# Patient Record
Sex: FEMALE | Race: WHITE
[De-identification: names, ages, dates, MRNs, and addresses within clinical notes are randomized per-mention and may not be internally consistent; named-entity substitution may affect disease eponyms.]

---

## 2019-06-21 ENCOUNTER — HOSPITAL ENCOUNTER (INPATIENT)
Dept: HOSPITAL 47 - 4FBP | Age: 29
LOS: 1 days | Discharge: HOME | End: 2019-06-22
Attending: OBSTETRICS & GYNECOLOGY | Admitting: OBSTETRICS & GYNECOLOGY
Payer: COMMERCIAL

## 2019-06-21 VITALS — BODY MASS INDEX: 37.5 KG/M2

## 2019-06-21 DIAGNOSIS — Z3A.39: ICD-10-CM

## 2019-06-21 DIAGNOSIS — O36.63X0: Primary | ICD-10-CM

## 2019-06-21 DIAGNOSIS — Z79.899: ICD-10-CM

## 2019-06-21 LAB
BASOPHILS # BLD AUTO: 0 K/UL (ref 0–0.2)
BASOPHILS NFR BLD AUTO: 0 %
EOSINOPHIL # BLD AUTO: 0.1 K/UL (ref 0–0.7)
EOSINOPHIL NFR BLD AUTO: 1 %
ERYTHROCYTE [DISTWIDTH] IN BLOOD BY AUTOMATED COUNT: 4.05 M/UL (ref 3.8–5.4)
ERYTHROCYTE [DISTWIDTH] IN BLOOD: 15.4 % (ref 11.5–15.5)
HCT VFR BLD AUTO: 31.2 % (ref 34–46)
HGB BLD-MCNC: 10.2 GM/DL (ref 11.4–16)
LYMPHOCYTES # SPEC AUTO: 2.6 K/UL (ref 1–4.8)
LYMPHOCYTES NFR SPEC AUTO: 25 %
MCH RBC QN AUTO: 25.1 PG (ref 25–35)
MCHC RBC AUTO-ENTMCNC: 32.5 G/DL (ref 31–37)
MCV RBC AUTO: 77.2 FL (ref 80–100)
MONOCYTES # BLD AUTO: 0.5 K/UL (ref 0–1)
MONOCYTES NFR BLD AUTO: 5 %
NEUTROPHILS # BLD AUTO: 7 K/UL (ref 1.3–7.7)
NEUTROPHILS NFR BLD AUTO: 67 %
PLATELET # BLD AUTO: 249 K/UL (ref 150–450)
WBC # BLD AUTO: 10.5 K/UL (ref 3.8–10.6)

## 2019-06-21 PROCEDURE — 86850 RBC ANTIBODY SCREEN: CPT

## 2019-06-21 PROCEDURE — 86900 BLOOD TYPING SEROLOGIC ABO: CPT

## 2019-06-21 PROCEDURE — 00HU33Z INSERTION OF INFUSION DEVICE INTO SPINAL CANAL, PERCUTANEOUS APPROACH: ICD-10-PCS

## 2019-06-21 PROCEDURE — 3E0R3BZ INTRODUCTION OF ANESTHETIC AGENT INTO SPINAL CANAL, PERCUTANEOUS APPROACH: ICD-10-PCS

## 2019-06-21 PROCEDURE — 0KQM0ZZ REPAIR PERINEUM MUSCLE, OPEN APPROACH: ICD-10-PCS

## 2019-06-21 PROCEDURE — 3E033VJ INTRODUCTION OF OTHER HORMONE INTO PERIPHERAL VEIN, PERCUTANEOUS APPROACH: ICD-10-PCS

## 2019-06-21 PROCEDURE — 10907ZC DRAINAGE OF AMNIOTIC FLUID, THERAPEUTIC FROM PRODUCTS OF CONCEPTION, VIA NATURAL OR ARTIFICIAL OPENING: ICD-10-PCS

## 2019-06-21 PROCEDURE — 85025 COMPLETE CBC W/AUTO DIFF WBC: CPT

## 2019-06-21 PROCEDURE — 86901 BLOOD TYPING SEROLOGIC RH(D): CPT

## 2019-06-21 RX ADMIN — POTASSIUM CHLORIDE SCH MLS/HR: 14.9 INJECTION, SOLUTION INTRAVENOUS at 07:12

## 2019-06-21 RX ADMIN — POTASSIUM CHLORIDE SCH MLS/HR: 14.9 INJECTION, SOLUTION INTRAVENOUS at 15:00

## 2019-06-21 RX ADMIN — DOCUSATE SODIUM AND SENNOSIDES SCH EACH: 50; 8.6 TABLET ORAL at 19:51

## 2019-06-21 RX ADMIN — IBUPROFEN PRN MG: 600 TABLET ORAL at 19:51

## 2019-06-21 NOTE — P.HPOB
History of Present Illness


H&P Date: 19


Chief Complaint: 39-0/7 weeks, elective induction





The patient is a 28-year-old  4 para  admitted at 39-0/7 weeks as 

established by last menstrual period and confirmed by 8 week ultrasound.  She is

admitted for elective induction with a favorable cervix.  She was found with a 

large for gestational age fetus with ultrasound at 35 weeks demonstrating growth

at the 91st percent percentile.  Her pregnancy has otherwise been uncomplicated 

and group B strep status is negative.  On labor and delivery, all signs 

reassuring.





Obstetrical history:  4 para  with 2 term vaginal deliveries and 1 

early miscarriage not requiring D&C.  Current pregnancy statistics are listed in

history present illness.  EDC of 2019 was established by last menstrual 

period and confirmed by 8 week ultrasound.  Laboratory workup done shows a blood

type of O+ with a negative antibody screen.  Rubella status is immune.  The 

remainder of the laboratory workup was within normal limits.  Early Glucola as 

well as second trimester Glucola were within normal limits and group B strep 

status is negative.





Gynecologic history: Unremarkable with no history of any infections to include 

STDs.





Review of Systems





Review of systems is confined to history of present illness.





Past Medical History


Past Medical History: No Reported History


History of Any Multi-Drug Resistant Organisms: None Reported


Past Surgical History: No Surgical Hx Reported


Past Psychological History: No Psychological Hx Reported


Smoking Status: Never smoker


Past Drug Use History: None Reported





- Past Family History


  ** Mother


Family Medical History: No Reported History





Medications and Allergies


                                Home Medications











 Medication  Instructions  Recorded  Confirmed  Type


 


Acetaminophen/Diphenhydramine 2 each PO HS PRN 19 History





[Tylenol -25mg]    


 


Omeprazole 40 mg PO DAILY 19 History


 


Pnv,Calcium 72/Iron/Folic Acid 1 each PO DAILY 19 History





[Prenatal Plus Tablet]    








                                    Allergies











Allergy/AdvReac Type Severity Reaction Status Date / Time


 


No Known Allergies Allergy   Verified 19 06:47














Exam


                                Intake and Output











 19





 06:59 14:59 22:59


 


Other:   


 


  Weight 96.162 kg  














In general, this is a well-developed, well-nourished white female in no acute 

distress.  Her heart has a regular rhythm and rate without murmur.  Her lungs 

are clear to auscultation bilaterally in all fields.  Her abdomen is gravid, 

nondistended, has normal active bowel sounds, is soft, nontender, and without 

any palpable masses aside from the uterine fundus.  Her extremities are without 

any cyanosis, clubbing, or significant edema and are nontender to palpation 

bilaterally.  Digital cervical examination demonstrates her cervix to be 3 cm 

dilated, approximate 70% effaced, with the vertex in presentation at -2 station.

 Artificial rupture of membranes is carried out demonstrating clear fluid.





Results


Result Diagrams: 


                                 19 07:00





                  Abnormal Lab Results - Last 24 Hours (Table)











  19 Range/Units





  07:00 


 


Hgb  10.2 L  (11.4-16.0)  gm/dL


 


Hct  31.2 L  (34.0-46.0)  %


 


MCV  77.2 L  (80.0-100.0)  fL














Assessment and Plan


(1) Term pregnancy


Current Visit: Yes   Status: Acute   Code(s): Z34.90 - ENCNTR FOR SUPRVSN OF 

NORMAL PREGNANCY, UNSP, UNSP TRIMESTER   SNOMED Code(s): 04220139


   





(2) Large for gestational age fetus


Current Visit: Yes   Status: Acute   Code(s): WAT5436 -    SNOMED Code(s): 

320114945


   


Plan: 





The patient is admitted for elective induction of labor.  She understands the 

slightly increased risk for  delivery under the circumstances but does 

have a favorable cervix.  Pitocin augmentation has been started and she has 

undergone artificial rupture of membranes.  She will have close maternal and 

fetal surveillance and expectant management will be practiced.  She is a good 

candidate for either IV or epidural analgesia and has requested an epidural 

catheter replaced when possible.

## 2019-06-21 NOTE — P.PROBDLV
Vaginal Delivery Note





- .


Vaginal Delivery Note: 





The patient is a 28-year-old  4 para  admitted at 39-0/7 weeks by 

good dating parameters.  She is admitted for an elective induction of labor with

favorable cervix.  On admission all signs reassuring.  Her pregnancy was 

uncomplicated though the fetus was growing at the 91st percentile by ultrasound 

at 35 weeks.  She had Pitocin augmentation started followed by artificial 

rupture of membranes for clear fluid.  She had an epidural catheter placed 

around the onset of the active phase of labor.  She progressed relatively 

steadily but slowly through the active phase of labor but ultimately reached 

complete after which time she pushed over the course of approximately 6 

contractions to a normal spontaneous vaginal delivery of a viable 8 lbs. 5 oz. 

baby girl with Apgars of 8 at 1 minute and 9 at 5 minutes delivered in the left 

occiput anterior position.  The placenta was delivered spontaneously, intact, 

and grossly normal with a grossly normal three-vessel cord inserted 

approximately 4 cm from the margin of the placental disc.  There was a small 

second-degree midline laceration over the site of a previous laceration which 

was repaired in standard fashion using 3-0 chromic catgut without difficulty.  

Estimated blood loss for the case suppression 150 mL.  There were no 

complications.  All sponge, instrument, and needle counts were correct.  Both 

mother and infant are resting comfortably in recovery.

## 2019-06-22 VITALS — SYSTOLIC BLOOD PRESSURE: 133 MMHG | RESPIRATION RATE: 16 BRPM | HEART RATE: 70 BPM | DIASTOLIC BLOOD PRESSURE: 72 MMHG

## 2019-06-22 VITALS — TEMPERATURE: 97.8 F

## 2019-06-22 RX ADMIN — IBUPROFEN PRN MG: 600 TABLET ORAL at 08:34

## 2019-06-22 RX ADMIN — DOCUSATE SODIUM AND SENNOSIDES SCH EACH: 50; 8.6 TABLET ORAL at 08:34

## 2019-06-22 RX ADMIN — POTASSIUM CHLORIDE SCH: 14.9 INJECTION, SOLUTION INTRAVENOUS at 02:50

## 2019-06-22 RX ADMIN — IBUPROFEN PRN MG: 600 TABLET ORAL at 14:59

## 2019-06-22 NOTE — P.DS
Providers


Date of admission: 


19 06:30





Expected date of discharge: 19


Attending physician: 


Og Jeter





Primary care physician: 


Stated None








- Discharge Diagnosis(es)


(1) Term pregnancy


Current Visit: Yes   Status: Acute   





(2) Large for gestational age fetus


Current Visit: Yes   Status: Acute   





(3) Normal spontaneous vaginal delivery


Current Visit: Yes   Status: Acute   


Hospital Course: 





The patient is a 28-year-old  4 para  admitted at 39-0/7 weeks by 

good dating parameters for an elective induction.  She was known to have a large

for gestational age fetus growing at the 94th percentile at 35 weeks.  Her 

pregnancy was otherwise uncomplicated and group B strep status is negative.  On 

labor and delivery, all signs reassuring.  She had Pitocin started followed by 

artificial rupture of membranes for clear fluid.  She had an epidural catheter 

placed for analgesia.  She progressed throughout the day to complete and then 

pushed fairly quickly to a normal spontaneous vaginal delivery of a viable 8 

lbs. 5 oz. baby girl with Apgars of 8 at 1 minute and 9 at 5 minutes.  Her 

postpartum course was unremarkable with vital signs remaining stable and her 

temperature was afebrile throughout.  She was deemed stable for discharge on 

postpartum day #1 and was discharged home to follow-up in the office in 6 weeks'

time routinely.  Discharge instructions included calling for any significantly 

increased bleeding or foul-smelling lochia, significantly increased fever or 

abdominal pain, perineal complaints, breast complaints, or anything else that 

concerned her.  She is additionally instructed to have nothing in the vagina for

at least 6 weeks time to include intercourse.  She understood her instructions 

and agrees to follow up as noted above.  Discharge medications included 

continued prenatal vitamins as she has opted to breast-feed.  She otherwise was 

to use over-the-counter analgesic pain medications as needed.  Maternal blood 

type is O+ and rubella status is immune.


Procedures: 





#1.  Pitocin induction #2.  Artificial rupture of membranes #3.  Epidural 

analgesia #4.  Normal spontaneous vaginal delivery #5.  Repair of perineal 

laceration


Patient Condition at Discharge: Good





Plan - Discharge Summary


New Discharge Prescriptions: 


No Action


   Pnv,Calcium 72/Iron/Folic Acid [Prenatal Plus Tablet] 1 each PO DAILY


   Omeprazole 40 mg PO DAILY


   Acetaminophen/Diphenhydramine [Tylenol -25mg] 2 each PO HS PRN


     PRN Reason: Pain


Discharge Medication List





Acetaminophen/Diphenhydramine [Tylenol -25mg] 2 each PO HS PRN 19 

[History]


Omeprazole 40 mg PO DAILY 19 [History]


Pnv,Calcium 72/Iron/Folic Acid [Prenatal Plus Tablet] 1 each PO DAILY 19 

[History]








Follow up Appointment(s)/Referral(s): 


Og Jeter MD [STAFF PHYSICIAN] - 6 Weeks


Discharge Disposition: HOME SELF-CARE

## 2021-02-07 ENCOUNTER — HOSPITAL ENCOUNTER (INPATIENT)
Dept: HOSPITAL 47 - EC | Age: 31
LOS: 1 days | Discharge: HOME | DRG: 62 | End: 2021-02-08
Attending: FAMILY MEDICINE | Admitting: FAMILY MEDICINE
Payer: COMMERCIAL

## 2021-02-07 DIAGNOSIS — G81.91: ICD-10-CM

## 2021-02-07 DIAGNOSIS — Z79.3: ICD-10-CM

## 2021-02-07 DIAGNOSIS — Z80.9: ICD-10-CM

## 2021-02-07 DIAGNOSIS — G43.909: ICD-10-CM

## 2021-02-07 DIAGNOSIS — E66.9: ICD-10-CM

## 2021-02-07 DIAGNOSIS — Z20.822: ICD-10-CM

## 2021-02-07 DIAGNOSIS — Z79.899: ICD-10-CM

## 2021-02-07 DIAGNOSIS — D68.69: ICD-10-CM

## 2021-02-07 DIAGNOSIS — Z98.890: ICD-10-CM

## 2021-02-07 DIAGNOSIS — E78.5: ICD-10-CM

## 2021-02-07 DIAGNOSIS — Z82.49: ICD-10-CM

## 2021-02-07 DIAGNOSIS — G45.9: Primary | ICD-10-CM

## 2021-02-07 DIAGNOSIS — G43.109: ICD-10-CM

## 2021-02-07 LAB
ALBUMIN SERPL-MCNC: 4.9 G/DL (ref 3.5–5)
ALP SERPL-CCNC: 113 U/L (ref 38–126)
ALT SERPL-CCNC: 27 U/L (ref 4–34)
ANION GAP SERPL CALC-SCNC: 11 MMOL/L
APTT BLD: 24.7 SEC (ref 22–30)
AST SERPL-CCNC: 24 U/L (ref 14–36)
BASOPHILS # BLD AUTO: 0 K/UL (ref 0–0.2)
BASOPHILS NFR BLD AUTO: 1 %
BUN SERPL-SCNC: 19 MG/DL (ref 7–17)
CALCIUM SPEC-MCNC: 10 MG/DL (ref 8.4–10.2)
CHLORIDE SERPL-SCNC: 106 MMOL/L (ref 98–107)
CHOLEST SERPL-MCNC: 218 MG/DL (ref ?–200)
CO2 SERPL-SCNC: 24 MMOL/L (ref 22–30)
EOSINOPHIL # BLD AUTO: 0.1 K/UL (ref 0–0.7)
EOSINOPHIL NFR BLD AUTO: 2 %
ERYTHROCYTE [DISTWIDTH] IN BLOOD BY AUTOMATED COUNT: 5.27 M/UL (ref 3.8–5.4)
ERYTHROCYTE [DISTWIDTH] IN BLOOD: 13.7 % (ref 11.5–15.5)
GLUCOSE BLD-MCNC: 80 MG/DL (ref 75–99)
GLUCOSE BLD-MCNC: 84 MG/DL (ref 75–99)
GLUCOSE SERPL-MCNC: 101 MG/DL (ref 74–99)
HCT VFR BLD AUTO: 42.9 % (ref 34–46)
HDLC SERPL-MCNC: 58 MG/DL (ref 40–60)
HGB BLD-MCNC: 14.2 GM/DL (ref 11.4–16)
INR PPP: 0.9 (ref ?–1.2)
LDLC SERPL CALC-MCNC: 142 MG/DL (ref 0–99)
LYMPHOCYTES # SPEC AUTO: 2.7 K/UL (ref 1–4.8)
LYMPHOCYTES NFR SPEC AUTO: 36 %
MCH RBC QN AUTO: 26.9 PG (ref 25–35)
MCHC RBC AUTO-ENTMCNC: 33.1 G/DL (ref 31–37)
MCV RBC AUTO: 81.5 FL (ref 80–100)
MONOCYTES # BLD AUTO: 0.4 K/UL (ref 0–1)
MONOCYTES NFR BLD AUTO: 5 %
NEUTROPHILS # BLD AUTO: 4.2 K/UL (ref 1.3–7.7)
NEUTROPHILS NFR BLD AUTO: 56 %
PH UR: 6.5 [PH] (ref 5–8)
PLATELET # BLD AUTO: 260 K/UL (ref 150–450)
POTASSIUM SERPL-SCNC: 3.7 MMOL/L (ref 3.5–5.1)
PROT SERPL-MCNC: 8 G/DL (ref 6.3–8.2)
PT BLD: 10 SEC (ref 9–12)
SODIUM SERPL-SCNC: 141 MMOL/L (ref 137–145)
SP GR UR: 1.05 (ref 1–1.03)
TRIGL SERPL-MCNC: 92 MG/DL (ref ?–150)
UROBILINOGEN UR QL STRIP: <2 MG/DL (ref ?–2)
WBC # BLD AUTO: 7.5 K/UL (ref 3.8–10.6)

## 2021-02-07 PROCEDURE — 71045 X-RAY EXAM CHEST 1 VIEW: CPT

## 2021-02-07 PROCEDURE — 81003 URINALYSIS AUTO W/O SCOPE: CPT

## 2021-02-07 PROCEDURE — 93308 TTE F-UP OR LMTD: CPT

## 2021-02-07 PROCEDURE — 36415 COLL VENOUS BLD VENIPUNCTURE: CPT

## 2021-02-07 PROCEDURE — 99291 CRITICAL CARE FIRST HOUR: CPT

## 2021-02-07 PROCEDURE — 85025 COMPLETE CBC W/AUTO DIFF WBC: CPT

## 2021-02-07 PROCEDURE — 80053 COMPREHEN METABOLIC PANEL: CPT

## 2021-02-07 PROCEDURE — 84443 ASSAY THYROID STIM HORMONE: CPT

## 2021-02-07 PROCEDURE — 81025 URINE PREGNANCY TEST: CPT

## 2021-02-07 PROCEDURE — 87635 SARS-COV-2 COVID-19 AMP PRB: CPT

## 2021-02-07 PROCEDURE — 84484 ASSAY OF TROPONIN QUANT: CPT

## 2021-02-07 PROCEDURE — 85610 PROTHROMBIN TIME: CPT

## 2021-02-07 PROCEDURE — 80061 LIPID PANEL: CPT

## 2021-02-07 PROCEDURE — 70450 CT HEAD/BRAIN W/O DYE: CPT

## 2021-02-07 PROCEDURE — 96360 HYDRATION IV INFUSION INIT: CPT

## 2021-02-07 PROCEDURE — 51702 INSERT TEMP BLADDER CATH: CPT

## 2021-02-07 PROCEDURE — 85730 THROMBOPLASTIN TIME PARTIAL: CPT

## 2021-02-07 PROCEDURE — 70496 CT ANGIOGRAPHY HEAD: CPT

## 2021-02-07 PROCEDURE — 37195 THROMBOLYTIC THERAPY STROKE: CPT

## 2021-02-07 PROCEDURE — 80048 BASIC METABOLIC PNL TOTAL CA: CPT

## 2021-02-07 PROCEDURE — 93005 ELECTROCARDIOGRAM TRACING: CPT

## 2021-02-07 PROCEDURE — 70498 CT ANGIOGRAPHY NECK: CPT

## 2021-02-07 PROCEDURE — 70551 MRI BRAIN STEM W/O DYE: CPT

## 2021-02-07 PROCEDURE — 80306 DRUG TEST PRSMV INSTRMNT: CPT

## 2021-02-07 PROCEDURE — 93306 TTE W/DOPPLER COMPLETE: CPT

## 2021-02-07 RX ADMIN — CEFAZOLIN SCH MLS/HR: 330 INJECTION, POWDER, FOR SOLUTION INTRAMUSCULAR; INTRAVENOUS at 15:29

## 2021-02-07 NOTE — CT
EXAMINATION TYPE: CT brain wo con for TPA

 

DATE OF EXAM: 2/7/2021

 

COMPARISON:

 

HISTORY: Rt side numbness

 

CT DLP: 1042.8 mGycm

 

Unenhanced CT of the brain was performed.  

 

The ventricles, basal cisterns and sulci overlying the cerebral convexities demonstrate a normal appe
arance.  

 

There is no evidence for intracranial hemorrhage or sulcal effacement.  

 

No mass effects are seen.  

 

Osseous calvarium is intact.

 

If symptoms persist consider MRI as clinically warranted.  

 

IMPRESSION:

 

1.  No acute intracranial process is seen at this time.

## 2021-02-07 NOTE — CT
EXAMINATION TYPE: CT angio head neck

 

DATE OF EXAM: 2/7/2021

 

COMPARISON: None

 

HISTORY: Rt side numbness

 

CT DLP: 583.6 mGycm

Automated exposure control for dose reduction was used.

 

CONTRAST: 

Performed with IV Contrast, patient injected with 65 mL of Isovue 370.

 

Images were obtained from the aortic arch to the vertex of the brain with IV contrast and 3-D post pr
ocessed images.

 

There is normal branching pattern of the great vessels on the aortic arch. Left vertebral artery has 
origin on the aortic arch. There is arterial flow in both subclavian arteries. There is arterial flow
 in the common internal and external carotid arteries bilaterally. There is wide patency of the carot
id artery bifurcations. There is arterial flow in both vertebral arteries which are fairly symmetric.
 There is no evidence of carotid or vertebral artery aneurysm or dissection. Vertebrobasilar artery s
ystem appears normal.

 

There is arterial flow in the anterior middle and posterior cerebral arteries. I see no evidence of i
ntracranial arterial stenosis. There is no sign of aneurysm or neovascularity. There is no mass effec
t.

 

There is normal contrast opacification of the venous sinuses. Calvarium is intact.

 

IMPRESSION:

Normal CT angiogram of the neck.

 

Normal CT angiogram of the brain.

## 2021-02-07 NOTE — XR
EXAMINATION TYPE: XR chest 1V portable

 

DATE OF EXAM: 2/7/2021

 

COMPARISON: NONE

 

HISTORY: Weakness

 

TECHNIQUE: Single view

 

FINDINGS: Heart and mediastinum are normal. Lungs are clear. Diaphragm is normal. There are chest navid
ds. Bony thorax is intact.

 

IMPRESSION: Normal chest. Normal heart.

## 2021-02-07 NOTE — ED
General Adult HPI





- General


Chief complaint: Neuro Symptoms/Deficit


Stated complaint: R Sided Numbness


Time Seen by Provider: 02/07/21 13:10


Source: patient, RN notes reviewed, old records reviewed


Mode of arrival: ambulatory


Limitations: no limitations





- History of Present Illness


Initial comments: 





30-year-old female history of migraine headache presenting for evaluation of 

right-sided weakness.  The symptoms began suddenly between 10:30 and 11 AM this 

morning.  She is unable to move her right arm or right leg at all.  She is not 

noted to have any facial weakness.  No difficulty speaking.  She denies 

headache.  She denies chest pain or palpitations.  She denies any recent 

illness.  She has no previous history of TIA or CVA.  She has no previous 

history of limb weakness with migraine headache.  She denies abdominal pain 

nausea vomiting.  She has been eating and drinking well.  No previous cardiac 

history.





- Related Data


                                Home Medications











 Medication  Instructions  Recorded  Confirmed


 


Acetaminophen/Diphenhydramine 2 each PO HS PRN 06/21/19 06/21/19





[Tylenol -25mg]   


 


Omeprazole 40 mg PO DAILY 06/21/19 06/21/19


 


Pnv,Calcium 72/Iron/Folic Acid 1 each PO DAILY 06/21/19 06/21/19





[Prenatal Plus Tablet]   











                                    Allergies











Allergy/AdvReac Type Severity Reaction Status Date / Time


 


No Known Allergies Allergy   Verified 02/07/21 13:18














Review of Systems


ROS Statement: 


Those systems with pertinent positive or pertinent negative responses have been 

documented in the HPI.





ROS Other: All systems not noted in ROS Statement are negative.





Past Medical History


Past Medical History: No Reported History


Additional Past Medical History / Comment(s): migraines


History of Any Multi-Drug Resistant Organisms: None Reported


Past Surgical History: No Surgical Hx Reported


Past Psychological History: No Psychological Hx Reported


Smoking Status: Never smoker


Past Alcohol Use History: Rare


Past Drug Use History: None Reported





- Past Family History


  ** Mother


Family Medical History: No Reported History





General Exam


Limitations: no limitations


General appearance: alert, in no apparent distress


Head exam: Present: atraumatic, normocephalic


Eye exam: Present: normal appearance, PERRL


ENT exam: Present: normal exam


Neck exam: Present: normal inspection.  Absent: tenderness, meningismus


Respiratory exam: Present: normal lung sounds bilaterally.  Absent: respiratory 

distress, wheezes


Cardiovascular Exam: Present: regular rate, normal rhythm


GI/Abdominal exam: Present: soft.  Absent: distended, tenderness, guarding


Extremities exam: Present: normal inspection, normal capillary refill, other 

(Normal distal pulses).  Absent: calf tenderness


Neurological exam: Present: alert, oriented X3, motor sensory deficit (Patient 

is hemiplegic, no movement of the right arm or right leg.  Her sensation is 

intact.)


Psychiatric exam: Present: normal affect, normal mood.  Absent: depressed, 

anxious


Skin exam: Present: warm, dry, intact.  Absent: cyanosis, diaphoretic





Course


                                   Vital Signs











  02/07/21 02/07/21 02/07/21





  13:10 13:30 13:45


 


Temperature 98 F 98.2 F 98.4 F


 


Pulse Rate 82 65 82


 


Respiratory 18 18 18





Rate   


 


Blood Pressure 129/81 131/90 128/89


 


O2 Sat by Pulse 97 99 98





Oximetry   














  02/07/21 02/07/21 02/07/21





  14:00 14:15 14:30


 


Temperature 98.8 F 98.4 F 98.4 F


 


Pulse Rate 64 63 82


 


Respiratory 18 18 18





Rate   


 


Blood Pressure 128/86 124/79 124/84


 


O2 Sat by Pulse 99 100 99





Oximetry   














  02/07/21 02/07/21





  14:45 15:00


 


Temperature 98.4 F 98.0 F


 


Pulse Rate 84 60


 


Respiratory 18 18





Rate  


 


Blood Pressure 127/82 117/81


 


O2 Sat by Pulse 99 99





Oximetry  














- Reevaluation(s)


Reevaluation #1: 





02/07/21 13:36


Patient denies current pregnancy, states she is on Depo-Provera.


02/07/21 13:40


Case discussed with stroke neurologist Dr. Glez, recommends TPA if CT is 

negative.  NIH of 10.


Reevaluation #2: 





02/07/21 1409


Discussed with Dr. Sosa, TPA has been initiated, head CT and CT angiography 

negative.  Recommends patient be admitted to the ICU with neurology on consult. 

No need for transfer at this time.





EKG Findings





- EKG Comments:


EKG Findings:: EKG: Sinus rhythm rate of 70, NE interval 110, QRS duration 82, 

 no ST segment elevation.





Medical Decision Making





- Medical Decision Making





30-year-old female presenting with signs and symptoms suggestive of acute CVA 

with onset time which is within TPA window.  She's taken immediately to head CT 

which is performed negative for intracranial hemorrhage.  TPA is initiated with 

the guidance of stroke neurologist Dr. Sosa.  CT angiography performed and 

there is no acute findings.  No cyanosis or occlusion.  Patient has a normal 

CBC, normal CMP.  Urinalysis is pending.  Patient has some improvement with an 

NIH initially of 10 which improves to an NIH of 6 while in the emergency 

Department with some improvement in the upper extremity.  I discussed case with 

Dr. Ariana brown for neurology who is agreeable with admission recommends no 

antiplatelets or anticoagulants until repeat imaging is obtained.  I did discuss

case with Dr. Lane covering for the ICU will accept this patient.  Patient has 

been admitted to Dr. Bernardo who is aware of the patient.





- Lab Data


Result diagrams: 


                                 02/07/21 13:46





                                 02/07/21 13:46


                                   Lab Results











  02/07/21 02/07/21 02/07/21 Range/Units





  13:30 13:46 13:46 


 


WBC   7.5   (3.8-10.6)  k/uL


 


RBC   5.27   (3.80-5.40)  m/uL


 


Hgb   14.2   (11.4-16.0)  gm/dL


 


Hct   42.9   (34.0-46.0)  %


 


MCV   81.5   (80.0-100.0)  fL


 


MCH   26.9   (25.0-35.0)  pg


 


MCHC   33.1   (31.0-37.0)  g/dL


 


RDW   13.7   (11.5-15.5)  %


 


Plt Count   260   (150-450)  k/uL


 


MPV   6.9   


 


Neutrophils %   56   %


 


Lymphocytes %   36   %


 


Monocytes %   5   %


 


Eosinophils %   2   %


 


Basophils %   1   %


 


Neutrophils #   4.2   (1.3-7.7)  k/uL


 


Lymphocytes #   2.7   (1.0-4.8)  k/uL


 


Monocytes #   0.4   (0-1.0)  k/uL


 


Eosinophils #   0.1   (0-0.7)  k/uL


 


Basophils #   0.0   (0-0.2)  k/uL


 


PT    10.0  (9.0-12.0)  sec


 


INR    0.9  (<1.2)  


 


APTT    24.7  (22.0-30.0)  sec


 


Sodium     (137-145)  mmol/L


 


Potassium     (3.5-5.1)  mmol/L


 


Chloride     ()  mmol/L


 


Carbon Dioxide     (22-30)  mmol/L


 


Anion Gap     mmol/L


 


BUN     (7-17)  mg/dL


 


Creatinine     (0.52-1.04)  mg/dL


 


Est GFR (CKD-EPI)AfAm     (>60 ml/min/1.73 sqM)  


 


Est GFR (CKD-EPI)NonAf     (>60 ml/min/1.73 sqM)  


 


Glucose     (74-99)  mg/dL


 


POC Glucose (mg/dL)  84    (75-99)  mg/dL


 


POC Glu Operater ID  Yashira Thornton    


 


Calcium     (8.4-10.2)  mg/dL


 


Total Bilirubin     (0.2-1.3)  mg/dL


 


AST     (14-36)  U/L


 


ALT     (4-34)  U/L


 


Alkaline Phosphatase     ()  U/L


 


Troponin I     (0.000-0.034)  ng/mL


 


Total Protein     (6.3-8.2)  g/dL


 


Albumin     (3.5-5.0)  g/dL


 


Urine HCG, Qual     (Not Detectd)  














  02/07/21 02/07/21 02/07/21 Range/Units





  13:46 13:46 14:51 


 


WBC     (3.8-10.6)  k/uL


 


RBC     (3.80-5.40)  m/uL


 


Hgb     (11.4-16.0)  gm/dL


 


Hct     (34.0-46.0)  %


 


MCV     (80.0-100.0)  fL


 


MCH     (25.0-35.0)  pg


 


MCHC     (31.0-37.0)  g/dL


 


RDW     (11.5-15.5)  %


 


Plt Count     (150-450)  k/uL


 


MPV     


 


Neutrophils %     %


 


Lymphocytes %     %


 


Monocytes %     %


 


Eosinophils %     %


 


Basophils %     %


 


Neutrophils #     (1.3-7.7)  k/uL


 


Lymphocytes #     (1.0-4.8)  k/uL


 


Monocytes #     (0-1.0)  k/uL


 


Eosinophils #     (0-0.7)  k/uL


 


Basophils #     (0-0.2)  k/uL


 


PT     (9.0-12.0)  sec


 


INR     (<1.2)  


 


APTT     (22.0-30.0)  sec


 


Sodium  141    (137-145)  mmol/L


 


Potassium  3.7    (3.5-5.1)  mmol/L


 


Chloride  106    ()  mmol/L


 


Carbon Dioxide  24    (22-30)  mmol/L


 


Anion Gap  11    mmol/L


 


BUN  19 H    (7-17)  mg/dL


 


Creatinine  0.68    (0.52-1.04)  mg/dL


 


Est GFR (CKD-EPI)AfAm  >90    (>60 ml/min/1.73 sqM)  


 


Est GFR (CKD-EPI)NonAf  >90    (>60 ml/min/1.73 sqM)  


 


Glucose  101 H    (74-99)  mg/dL


 


POC Glucose (mg/dL)     (75-99)  mg/dL


 


POC Glu Operater ID     


 


Calcium  10.0    (8.4-10.2)  mg/dL


 


Total Bilirubin  0.6    (0.2-1.3)  mg/dL


 


AST  24    (14-36)  U/L


 


ALT  27    (4-34)  U/L


 


Alkaline Phosphatase  113    ()  U/L


 


Troponin I   <0.012   (0.000-0.034)  ng/mL


 


Total Protein  8.0    (6.3-8.2)  g/dL


 


Albumin  4.9    (3.5-5.0)  g/dL


 


Urine HCG, Qual    Not Detected  (Not Detectd)  














Critical Care Time


Critical Care Time: Yes


Total Critical Care Time: 35





Disposition


Clinical Impression: 


 Cerebrovascular accident (CVA)





Disposition: ADMITTED AS IP TO THIS [facility]


Condition: Serious


Is patient prescribed a controlled substance at d/c from ED?: No


Referrals: 


Angela Bull DO [Primary Care Provider] - 1-2 days


Decision to Admit Reason: Admit from EC


Decision Date: 02/07/21


Decision Time: 15:15

## 2021-02-07 NOTE — HP
HISTORY AND PHYSICAL



DATE OF SERVICE:

02/07/2021



CHIEF COMPLAINT:

Weakness of the right side of the body.



HISTORY OF PRESENT ILLNESS:

This 30-year-old woman with a past medical history of recurrent migraines, being

followed by Dr. Bull in the outpatient setting was complaining of weakness of the

right side of the body since 11 o'clock.  The patient had sudden onset of weakness

almost grade 0 power.  The patient unable to move the leg at all and there is no facial

weakness and the patient was evaluated by tele neurology, Dr. Dotson and Neurology and

CT angio, brain CT was also done and the patient was given tPA and the patient being

closely monitored.  Patient is supposed to be monitored and transferred to ICU at this

time.  There is no history of fever, rigors.  No history of headache, loss of

consciousness, seizures at this time.



PAST MEDICAL HISTORY:

History of migraine.



MEDICATIONS:

Prior to admission include Depo shots.  Home medications: Omeprazole, Tylenol and

prenatal vitamins.



ALLERGIES:

None.



FAMILY HISTORY:

No history of heart disease or strokes in the family.



SOCIAL HISTORY:

No history of smoking.  No history of alcohol intake.



REVIEW OF SYSTEMS:

ENT as mentioned earlier.

CARDIOVASCULAR: No angina.

RESPIRATION: No cough or hemoptysis.

GI no nausea or vomiting.

: No dysuria.

NERVOUS SYSTEM as mentioned earlier.

ALLERGY/IMMUNOLOGY:  No asthma or hayfever.

MUSCULOSKELETAL: As mentioned earlier.

HEMATOLOGY/ONCOLOGY: No history of anemia.

ENDOCRINE: No history of diabetes or hypothyroidism.

CONSTITUTIONAL: As mentioned earlier.

DERMATOLOGY:  Negative.

RHEUMATOLOGY negative.

PSYCHIATRY as mentioned earlier.



PHYSICAL EXAMINATION:

Alert and oriented x3.  Pulse 60. Blood pressure 117/80, respirations 18, temperature

98 degrees, pulse ox 99% on room air.

HEENT: Conjunctivae normal.

NECK: No JVD.

CARDIOVASCULAR: S1, S2.

RESPIRATORY: Breath sounds diminished in the bases.  No rhonchi. No crackles.

ABDOMEN:  Soft, nontender.

LEGS are no edema. No swelling.

NERVOUS SYSTEM: Higher functions as mentioned earlier. Cranial nerves II-XII grossly

intact.  Otherwise significant weakness of the grade 1 power on the right upper and

right lower limb.  The reflexes are diminished.  Gait not tested.

SKIN: No ulcers, rashes or bleeding.

JOINTS: No active deforming arthropathy.

LYMPHATICS: No lymph nodes palpable in the neck, axillae or groin.



LABS:

At this time, CBC within normal limits. Sodium 140, potassium 3.7.  UA noted.  Drug

screen is negative.



ASSESSMENT:

1. Possible acute cerebrovascular accident involving the left hemisphere causing right-

    sided weakness, status post tPA.

2. History of migraine.

3. Increased BUN.

4. Increased random blood glucose.

5. Obesity with body mass index 32.



RECOMMENDATIONS AND DISCUSSION:

This 30-year-old woman who presented with multiple medical issues, at this time, I

recommend to continue current medications, management and symptomatic treatment.

Continue with TPA.  Possible TPA protocol.  Neurology consult, neurology evaluation.

Neuro checks.  Monitor closely.  ICU management per Dr. Lane.  The prognosis guarded

because of multiple complex medical issues.  Further recommendations to follow. Two-D

echo and lipid panel also has been requested.  Repeat labs also will be obtained. The

EKG shows no acute abnormality and the chest x-ray was also reviewed.  Prognosis

guarded because of multiple complex medical issues.  Dr. Gonzales will follow tomorrow.



MMODL / IJN: [id number] / Job#: [id number]

## 2021-02-08 VITALS — DIASTOLIC BLOOD PRESSURE: 70 MMHG | RESPIRATION RATE: 14 BRPM | HEART RATE: 73 BPM | SYSTOLIC BLOOD PRESSURE: 116 MMHG

## 2021-02-08 VITALS — TEMPERATURE: 98.3 F

## 2021-02-08 LAB
ANION GAP SERPL CALC-SCNC: 9 MMOL/L
BASOPHILS # BLD AUTO: 0 K/UL (ref 0–0.2)
BASOPHILS NFR BLD AUTO: 0 %
BUN SERPL-SCNC: 19 MG/DL (ref 7–17)
CALCIUM SPEC-MCNC: 8.8 MG/DL (ref 8.4–10.2)
CHLORIDE SERPL-SCNC: 109 MMOL/L (ref 98–107)
CO2 SERPL-SCNC: 21 MMOL/L (ref 22–30)
EOSINOPHIL # BLD AUTO: 0.1 K/UL (ref 0–0.7)
EOSINOPHIL NFR BLD AUTO: 2 %
ERYTHROCYTE [DISTWIDTH] IN BLOOD BY AUTOMATED COUNT: 4.01 M/UL (ref 3.8–5.4)
ERYTHROCYTE [DISTWIDTH] IN BLOOD: 14.3 % (ref 11.5–15.5)
GLUCOSE SERPL-MCNC: 92 MG/DL (ref 74–99)
HCT VFR BLD AUTO: 33.4 % (ref 34–46)
HGB BLD-MCNC: 11.5 GM/DL (ref 11.4–16)
LYMPHOCYTES # SPEC AUTO: 3.7 K/UL (ref 1–4.8)
LYMPHOCYTES NFR SPEC AUTO: 46 %
MCH RBC QN AUTO: 28.8 PG (ref 25–35)
MCHC RBC AUTO-ENTMCNC: 34.5 G/DL (ref 31–37)
MCV RBC AUTO: 83.3 FL (ref 80–100)
MONOCYTES # BLD AUTO: 0.4 K/UL (ref 0–1)
MONOCYTES NFR BLD AUTO: 5 %
NEUTROPHILS # BLD AUTO: 3.5 K/UL (ref 1.3–7.7)
NEUTROPHILS NFR BLD AUTO: 45 %
PLATELET # BLD AUTO: 210 K/UL (ref 150–450)
POTASSIUM SERPL-SCNC: 3.7 MMOL/L (ref 3.5–5.1)
SODIUM SERPL-SCNC: 139 MMOL/L (ref 137–145)
WBC # BLD AUTO: 7.9 K/UL (ref 3.8–10.6)

## 2021-02-08 RX ADMIN — CEFAZOLIN SCH MLS/HR: 330 INJECTION, POWDER, FOR SOLUTION INTRAMUSCULAR; INTRAVENOUS at 01:28

## 2021-02-08 NOTE — P.DS
Providers


Date of admission: 


02/07/21 15:10





Expected date of discharge: 02/08/21


Attending physician: 


Dylan Gonzales MD





Consults: 





                                        





02/07/21 15:10


Consult Physician Urgent 


   Consulting Provider: Reji Lane


   Consult Reason/Comments: cva


   Do you want consulting provider notified?: Already Contacted


Consult Physician Urgent 


   Consulting Provider: Amarjit Lane


   Consult Reason/Comments: cva


   Do you want consulting provider notified?: Already Contacted











Primary care physician: 


Angela Bull





Hospital Course: 


Final Diagnoses:


Acute right hemiparesis, resolved, status post TPA, of possible TIA, possible 

complex migraine


Chronic migraines


Excessive caffeine intake, drinks 1 pot of coffee per day


Hyperlipidemia


Obesity, BMI 32

















Hospital course: This is a 30-year-old female presented to the ER with acute 

right-sided weakness, status post TPA with complete resolution of symptoms.  2 

through 12 grossly intact, no focal deficits, no residuals.  Evaluated by 

neurology, intensivist . Neuro workup completed :CTA of head and neck reported 

normal .MRI reporting normal brain, mild sinus disease.  The echo reported 

normal LV function, EF 55-60% with no aortic stenosis or regurgitation. 

Significant clinical improvement.  Cleared by both neurology and intensivist for

discharge.  Patient will be discharged home today in a stable condition with 

guarded prognosis.  Birth control  at this time contraindicated because of 

possible complex migraine versus TIA with further recommendations pending from 

her follow-up visit with PCP/GYN. for specific details.  Statin and aspirin 

initiated.











The impression and plan of care has been dictated as directed.





:


I performed a history and examination of this patient,  discussed the same with 

the dictator.  I agree with the dictator's note ,documented as a scribe.  Any 

additional findings or plans will be noted.








Patient Condition at Discharge: Stable





Plan - Discharge Summary


Discharge Rx Participant: No


New Discharge Prescriptions: 


New


   Aspirin EC [Ecotrin Low Dose] 81 mg PO DAILY #30 tablet.


   Atorvastatin [Lipitor] 80 mg PO HS #30 tab





Continue


   Multivitamins, Thera [Multivitamin (formulary)] 1 tab PO HS


   FLUoxetine HCL [PROzac] 40 mg PO HS


   Aspirin-Acet-Caff [id number] [Excedrin] 2 tab PO Q4H PRN


     PRN Reason: Migraine Headache


   SUMAtriptan succinate [Imitrex] 50 mg PO BID PRN


     PRN Reason: Migraine Headache





Discontinued


   medroxyPROGESTERone [Depo-Provera] 150 mg IM Q84D


Discharge Medication List





Aspirin-Acet-Caff [id number] [Excedrin] 2 tab PO Q4H PRN 02/07/21 [History]


FLUoxetine HCL [PROzac] 40 mg PO HS 02/07/21 [History]


Multivitamins, Thera [Multivitamin (formulary)] 1 tab PO HS 02/07/21 [History]


SUMAtriptan succinate [Imitrex] 50 mg PO BID PRN 02/07/21 [History]


Aspirin EC [Ecotrin Low Dose] 81 mg PO DAILY #30 tablet. 02/08/21 [Rx]


Atorvastatin [Lipitor] 80 mg PO HS #30 tab 02/08/21 [Rx]








Follow up Appointment(s)/Referral(s): 


Angela Bull DO [Primary Care Provider] - 3 Days


Kavin Michele DO [STAFF PHYSICIAN] - 2 Weeks


Activity/Diet/Wound Care/Special Instructions: 


birth control contraindicated at this time, to be discussed further at 

outpatient follow-up with PCP for further recommendations.


Maintain log of migraine activity, take to follow-up visit with PCP and 

neurologist

## 2021-02-08 NOTE — MR
MR brain without contrast

 

HISTORY: Acute CVA, right-sided numbness

 

Multiplanar multisequence imaging obtained through the brain

 

Correlation CT brain 2/7/2021

 

There is no restricted diffusion to suggest subacute ischemia. There is no hemorrhage or hydrocephalu
s. Brain signal is maintained. Cerebellopontine angles, corpus callosum, pituitary, cervical medullar
y junction are normal. Orbits show symmetric appearance. Paranasal sinuses and mastoid air cells are 
well aerated, mild mucosal disease present in the ethmoid air cells. There are normal vascular flow v
oids.

 

IMPRESSION: Normal brain MRI. Mild sinus disease.

## 2021-02-08 NOTE — P.CNPUL
History of Present Illness


Consult date: 02/07/21


Chief complaint: acute motor weakness


History of present illness: 








30-year-old female presenting with signs and symptoms suggestive of acute CVA 

with onset time which was within TPA window. The patient is known to have 

history of migraine headaches and the patient presented with a right-sided 

weakness.  The weakness started around 11 PM in the morning.  She was unable to 

move her arm and the leg.  The patient was not noted to have any facial 

weakness.  No difficulty with speech.  She denied having any headaches.  No 

chest pain.  No palpitation.  No neck stiffness.  No meningitis.  No altered 

mentation.  No previous episodes of CVA or TIA.  No similar episodes in the 

past.  Most of any cardiac disease.  Postoperative atrial fibrillation.  She 

takes Soma triptan after milligrams on a when necessary basis for headache.  She

is also on Prozac 40 mg by mouth daily and she takes Excedrin migraine for 

headache on a when necessary basis.  The blood work was essentially negative.  

Urine drug screen was negative.  UA was negative.  Coronavirus covered 19 

testing was negative.  She's taken immediately to head CT which is performed 

negative for intracranial hemorrhage.  TPA is initiated with the guidance of 

stroke neurologist Dr. Sosa.  CT angiography performed and there is no acute 

findings.  No cyanosis or occlusion.  Patient has a normal CBC, normal CMP.  

Urinalysis is pending.  Patient has some improvement with an NIH initially of 10

which improves to an NIH of 6 while in the emergency Department with some 

improvement in the upper extremity.  Patient transferred to the intensive care 

unit for further monitoring.  She is hemodynamically stable.  The patient is 

free of any migraine for now.  For a while, she was having daily migraine 

attacks and she was taking Excedrin and symmetric 10 on a study basis.  This 

morning, she is awake and alert and she is back to normal.  No facial asymmetry.

 No change in her speech.  No headaches.  No seizure activity.  No tremors.  No 

fever.  No chills.  No cardiac arrhythmias.  Hemodynamically stable.  Again no 

headaches this morning.  MRI of the brain and EEG are both pending for now.





Review of Systems


Constitutional: Reports as per HPI, Reports weakness


Eyes: denies as per HPI, denies blurred vision, denies bulging eye, denies 

decreased vision, denies diplopia, denies discharge, denies dry eye, denies 

irritation, denies itching, denies pain, denies photophobia, denies loss of 

peripheral vision, denies loss of vision, denies tunnel vision/blind spots


Ears: deny: decreased hearing, ear discharge, earache, tinnitus


Breasts: absent: as per HPI, change in shape, gynecomastia, masses, nipple 

discharge, pain, skin changes, swelling


Cardiovascular: Reports as per HPI


Respiratory: Reports as per HPI


Gastrointestinal: Reports as per HPI


Genitourinary: Reports as per HPI


Menstruation: Reports as per HPI


Musculoskeletal: Reports as per HPI


Musculoskeletal: absent: ankle pain, ankle stiffness, ankle swelling, as per 

HPI, elbow pain, elbow stiffness, elbow swelling, foot pain, foot stiffness, 

foot swelling, hand pain, hand stiffness, hand swelling, hip pain, hip 

stiffness, hip swelling, knee pain, knee stiffness, knee swelling, shoulder 

pain, shoulder stiffness, shoulder swelling, wrist pain, wrist stiffness, wrist 

swelling


Neurological: Reports migraines, Reports weakness


Endocrine: Reports as per HPI


Hematologic/Lymphatic: Reports as per HPI


Allergic/Immunologic: Reports as per HPI





Past Medical History


Past Medical History: No Reported History


Additional Past Medical History / Comment(s): migraines


History of Any Multi-Drug Resistant Organisms: None Reported


Past Surgical History: No Surgical Hx Reported


Additional Past Surgical History / Comment(s): wisdom teeth removal 2011


Past Anesthesia/Blood Transfusion Reactions: No Reported Reaction


Past Psychological History: No Psychological Hx Reported


Smoking Status: Never smoker


Past Alcohol Use History: Rare


Past Drug Use History: None Reported





- Past Family History


  ** Mother


Family Medical History: Cancer





  ** Father


Family Medical History: Hypertension





  ** Brother(s)


Family Medical History: Hypertension





Medications and Allergies


                                Home Medications











 Medication  Instructions  Recorded  Confirmed  Type


 


Aspirin-Acet-Caff [id number] 2 tab PO Q4H PRN 02/07/21 02/07/21 History





[Excedrin]    


 


FLUoxetine HCL [PROzac] 40 mg PO HS 02/07/21 02/07/21 History


 


Multivitamins, Thera [Multivitamin 1 tab PO HS 02/07/21 02/07/21 History





(formulary)]    


 


SUMAtriptan succinate [Imitrex] 50 mg PO BID PRN 02/07/21 02/07/21 History


 


medroxyPROGESTERone [Depo-Provera] 150 mg IM Q84D 02/07/21 02/07/21 History








                                    Allergies











Allergy/AdvReac Type Severity Reaction Status Date / Time


 


No Known Allergies Allergy   Verified 02/07/21 16:15














Physical Exam


Vitals: 


                                   Vital Signs











  Temp Pulse Pulse Resp BP BP Pulse Ox


 


 02/07/21 19:00   59 L   20  123/92   97


 


 02/07/21 18:47   71   10 L  123/82   92 L


 


 02/07/21 18:45   60   12  147/90   96


 


 02/07/21 18:30   69   15  123/82   93 L


 


 02/07/21 18:16   64   18    93 L


 


 02/07/21 18:00   61  61  18  127/87  127/87  93 L


 


 02/07/21 17:44   60   13   


 


 02/07/21 17:15   68   19  111/88   97


 


 02/07/21 17:00  98.2 F  60  68  19  126/73  111/88  97


 


 02/07/21 16:15  98.2 F  88   18  120/84   99


 


 02/07/21 16:00  98.2 F  80   17  121/98   99


 


 02/07/21 15:45  98.4 F  68   18  121/82   99


 


 02/07/21 15:35  98.2 F    18   


 


 02/07/21 15:30   88   18  120/68   99


 


 02/07/21 15:00  98.0 F  60   18  117/81   99


 


 02/07/21 14:45  98.4 F  84   18  127/82   99


 


 02/07/21 14:30  98.4 F  82   18  124/84   99


 


 02/07/21 14:15  98.4 F  63   18  124/79   100


 


 02/07/21 14:00  98.8 F  64   18  128/86   99


 


 02/07/21 13:45  98.4 F  82   18  128/89   98


 


 02/07/21 13:30  98.2 F  65   18  131/90   99


 


 02/07/21 13:10  98 F  82   18  129/81   97








                                Intake and Output











 02/07/21 02/07/21 02/07/21





 06:59 14:59 22:59


 


Intake Total   200


 


Output Total   130


 


Balance   70


 


Intake:   


 


  Intake, IV Titration   200





  Amount   


 


    Sodium Chloride 0.9% 1,   200





    000 ml @ 100 mls/hr IV .   





    Q10H [facility] Rx#:[id number]   


 


Output:   


 


  Urine   130


 


Other:   


 


  Voiding Method   Indwelling Catheter


 


  Weight  82.3 kg 79.379 kg














The patient appeared well nourished and normally developed. Vital signs as 

documented. Head exam is unremarkable. No scleral icterus or corneal arcus 

noted. Neck is without jugular venous distension, thyromegaly, or carotid 

bruits. Carotid upstrokes are brisk bilaterally. Lungs are clear to auscultation

and percussion. Cardiac exam reveals the PMI to be normally sized and situated. 

Rhythm is regular. First and second heart sounds normal. No murmurs, rubs or 

gallops. Abdominal exam reveals normal bowel sounds, no masses, no organomegaly 

and no aortic enlargement. Extremities are nonedematous and both femoral and 

pedal pulses are normal.Examination of the skin revealed no evidence of signific

ant rashes, suspicious appearing nevi or other concerning 

lesions.Neurologically, the patient is awake and alert and the patient does not 

have any focal neurological deficit.  Cranial nerves are essentially intact.





Results





- Laboratory Findings


CBC and BMP: 


                                 02/08/21 03:14





                                 02/08/21 03:14


PT/INR, D-dimer











PT  10.0 sec (9.0-12.0)   02/07/21  13:46    


 


INR  0.9  (<1.2)   02/07/21  13:46    








Abnormal lab findings: 


                                  Abnormal Labs











  02/07/21 02/07/21





  13:46 14:51


 


BUN  19 H 


 


Glucose  101 H 


 


Ur Specific Gravity   1.048 H


 


Urine Protein   Trace H














Assessment and Plan


Plan: 








1 acute right-sided paralysis/weakness, post TPA administration with full 

recovery.  The patient is currently back to normal with adequate motor function 

the right side of the body.  No new onset focal neurological deficit.  The 

patient was given TPA per  Neuro interventional recommendation.  No cardiac 

arrhythmias noted.  CTA of the brain was negative.  Rule out complicated 

migraines.





2 history of migraine





3 hyperlipidemia 





PLAN





Neurologic monitoring here in the ICU


MRI of the brain


EEG


Neuro consultation


Lipitor 80 mg by mouth daily


The echocardiogram


Keep in ICU for today and she needs to be monitored at least 24 hours post TPA 

at menstruation


We'll continue to follow.

## 2021-02-08 NOTE — ECHOF
Referral Reason:Thrombus



MEASUREMENTS

--------

HEIGHT: 157.5 cm

WEIGHT: 79.4 kg

BP: 

RVIDd:   2.0 cm     (< 3.3)

IVSd:   0.8 cm     (0.6 - 1.1)

LVIDd:   4.1 cm     (3.9 - 5.3)

LVPWd:   0.9 cm     (0.6 - 1.1)

IVSs:   1.2 cm

LVIDs:   2.5 cm

LVPWs:   1.6 cm

LAESV Index (A-L):   16.48 ml/m

Ao Diam:   2.6 cm     (2.0 - 3.7)

AV Cusp:   1.9 cm     (1.5 - 2.6)

LA Diam:   2.8 cm     (2.7 - 3.8)

MV EXCURSION:   11.562 mm     (> 18.000)

MV EF SLOPE:   100 mm/s     (70 - 150)

EPSS:   0.7 cm

MV E Ajit:   1.27 m/s

MV DecT:   208 ms

MV A Ajit:   0.62 m/s

MV E/A Ratio:   2.06 

RAP:   5.00 mmHg

RVSP:   24.97 mmHg







FINDINGS

--------

Sinus rhythm.

This was a technically good study.

The left ventricular size is normal.   Left ventricular wall thickness is normal.   Overall left vent
ricular systolic function is normal with, an EF between 55 - 60 %.   The diastolic filling pattern is
 normal for the age of the patient 10.89.

The right ventricle is normal in size.

Normal LA  size by volume 22+/-6 ml/m2.

The right atrial size is normal.

Interatrial and interventricular septum intact.

The aortic valve is trileaflet, and appears structurally normal. No aortic stenosis or regurgitation.


The mitral valve is normal.   There is trace mitral regurgitation.

The tricuspid valve appears structurally normal.   Trace tricuspid regurgitation present.   Right annabel
tricular systolic pressure is normal at < 35 mmHg.

There is no pulmonic regurgitation present.

The aortic root size is normal.

Normal inferior vena cava with normal inspiratory collapse consistent with estimated right atrial pre
ssure of  5 mmHg.

There is no pericardial effusion.



CONCLUSIONS

--------

1. Overall left ventricular systolic function is normal with, an EF between 55 - 60 %.

2. The diastolic filling pattern is normal for the age of the patient 10.89

3. Normal LA size by volume 22+/-6 ml/m2.

4. The aortic valve is trileaflet, and appears structurally normal. No aortic stenosis or regurgitati
on.

5. There is trace mitral regurgitation.

6. Trace tricuspid regurgitation present.

7. There is no pericardial effusion.





SONOGRAPHER: Keeley Reilly RDCS

## 2021-02-08 NOTE — P.CNNES
History of Present Illness


Consult date: 02/08/21


Requesting physician: Reji Mcnair


Reason for Consult: CVA


History of Present Illness: 





Patient is a 30-year-old female with history of migraine headaches came to the 

hospital on 02/07/2021 at 1:10 PM for right-sided weakness.  Patient states that

she woke up at 9 AM and she was feeling fine.  She went back to sleep and woke 

up at around 11 AM when she notices that she couldn't move her body, but shortly

after noticed that she could not move her right, right leg.  There was no 

tingling.  In the ER it was reported that her symptoms started between 10:30 and

11 AM.  She was unable to move her right arm or right leg.  No facial weakness. 

No difficulty speaking.  No headache.  No previous history of TIA or CVA or any 

cardiac history.  No previous history of focal weakness related to his 

migraines.  Vital signs on arrival blood pressure 129/81, pulse rate 82, 

temperature 98.0.





CT head showed no acute intracranial process.  CTA of head and neck normal.  

Chest x-ray normal.  EKG shows sinus rhythm with sinus arrhythmia with short LA.

 Nonspecific ST abnormality.  Blood test shows normal CBC, PT/PTT, Chem-7 20.  

Patient's cholesterol is 218, , HDL 58 and triglycerides 92 TSH is 

normal.  Troponin negative.  UA negative.  Urine drug screen and corona virus 

PCR negative.





Her initial NIH stroke scale was reported as 10.  Stroke neurologist was 

consulted in the ED, and patient was Considered a candidate for TPA, which was 

given.  Patient's symptoms did improve to NIH stroke scale of 6 while TPA was 

being given.  Patient states that this morning she woke up around the symptoms 

are gone.





Patient at home takes Depo-Provera shots every 3 months, which was started in 

August 2020.  Prior to that she was not taking any birth control pills.  She 

also takes multivitamins, Prozac 40 mg, Excedrin and Imitrex 50 mg twice a day 

as needed.





Patient states that she has history of migraines since she was age 13.  Her 

migraines used to occur about twice a month.  Around October 2020, her primary 

physician Dr. Bull performed what appears like occipital nerve blocks 

bilaterally.  After that her headaches became worse, and she was having 

headaches every single day, with headache involving the top of the head 

extending to back of the eyes.  Patient states that she underwent Daith piercing

in the left ear 1-1/2 weeks ago, as someone had told her that it would help with

the migraines.  Patient says that for one week she didn't have any migraine.  

Even with this episode, she didn't have any headache.





Patient denies any tobacco, drinks alcohol occasionally denies any hypertension 

or diabetes or hyperlipidemia.  She does drink one pot of coffee per day.





Review of Systems





As above in detail.  All other review of systems are reviewed and unremarkable. 

Denies any chest pain shortness of breath wheezing or cough.  Denies any 

diplopia, slurred speech, facial droop.  Denies any numbness tingling focal 

weakness at this time.  Denies any nausea vomiting diarrhea, problem with the 

urine.





Past Medical History


Past Medical History: No Reported History


Additional Past Medical History / Comment(s): migraines


History of Any Multi-Drug Resistant Organisms: None Reported


Past Surgical History: No Surgical Hx Reported


Additional Past Surgical History / Comment(s): wisdom teeth removal 2011


Past Anesthesia/Blood Transfusion Reactions: No Reported Reaction


Past Psychological History: No Psychological Hx Reported


Smoking Status: Never smoker


Past Alcohol Use History: Rare


Past Drug Use History: None Reported





- Past Family History


  ** Mother


Family Medical History: Cancer





  ** Father


Family Medical History: Hypertension





  ** Brother(s)


Family Medical History: Hypertension





Medications and Allergies


                                Home Medications











 Medication  Instructions  Recorded  Confirmed  Type


 


Aspirin-Acet-Caff [id number] 2 tab PO Q4H PRN 02/07/21 02/07/21 History





[Excedrin]    


 


FLUoxetine HCL [PROzac] 40 mg PO HS 02/07/21 02/07/21 History


 


Multivitamins, Thera [Multivitamin 1 tab PO HS 02/07/21 02/07/21 History





(formulary)]    


 


SUMAtriptan succinate [Imitrex] 50 mg PO BID PRN 02/07/21 02/07/21 History


 


Aspirin EC [Ecotrin Low Dose] 81 mg PO DAILY #30 tablet. 02/08/21  Rx


 


Atorvastatin [Lipitor] 80 mg PO HS #30 tab 02/08/21  Rx








                                    Allergies











Allergy/AdvReac Type Severity Reaction Status Date / Time


 


No Known Allergies Allergy   Verified 02/07/21 16:15














Physical Examination





- Vital Signs


Vital Signs: 


                                   Vital Signs











  Temp Pulse Pulse Resp BP BP Pulse Ox


 


 02/08/21 07:07  98.3 F  60   17  130/91   97


 


 02/08/21 07:00   58 L   13  135/104   98


 


 02/08/21 06:42  98.6 F  59 L   18  122/81   98


 


 02/08/21 06:00   59 L   17  121/86   97


 


 02/08/21 05:00   58 L   19  126/90   96


 


 02/08/21 04:00  98.3 F  55 L   17  116/88   97


 


 02/08/21 03:00   69   15  118/74   97


 


 02/08/21 02:00   55 L   19  127/93   96


 


 02/08/21 01:00   59 L   16  120/71   98


 


 02/08/21 00:06   55 L   15    95


 


 02/08/21 00:00  99.1 F  60   14  118/80   97


 


 02/07/21 23:00   64   16  124/76   97


 


 02/07/21 22:22  98.6 F  59 L   18  122/81   98


 


 02/07/21 22:00   57 L   16  123/74   97


 


 02/07/21 21:00   51 L   21  117/80   98


 


 02/07/21 20:45   64   17  118/81   98


 


 02/07/21 20:30   65   17  125/90   97


 


 02/07/21 20:15   60   17  124/90   99


 


 02/07/21 20:00  98.6 F  62   13  122/92   98


 


 02/07/21 19:45   79   17   


 


 02/07/21 19:36   60   17  125/90   98


 


 02/07/21 19:30   64   17    99


 


 02/07/21 19:15   70   27 H   


 


 02/07/21 19:00   59 L   20  123/92   97


 


 02/07/21 18:47   71   10 L  123/82   92 L


 


 02/07/21 18:45   60   12  147/90   96


 


 02/07/21 18:30   69   15  123/82   93 L


 


 02/07/21 18:16   64   18    93 L


 


 02/07/21 18:00   61  61  18  127/87  127/87  93 L


 


 02/07/21 17:44   60   13   


 


 02/07/21 17:15   68   19  111/88   97


 


 02/07/21 17:00  98.2 F  60  68  19  126/73  111/88  97


 


 02/07/21 16:15  98.2 F  88   18  120/84   99


 


 02/07/21 16:00  98.2 F  80   17  121/98   99


 


 02/07/21 15:45  98.4 F  68   18  121/82   99


 


 02/07/21 15:35  98.2 F    18   


 


 02/07/21 15:30   88   18  120/68   99


 


 02/07/21 15:00  98.0 F  60   18  117/81   99


 


 02/07/21 14:45  98.4 F  84   18  127/82   99


 


 02/07/21 14:30  98.4 F  82   18  124/84   99


 


 02/07/21 14:15  98.4 F  63   18  124/79   100


 


 02/07/21 14:00  98.8 F  64   18  128/86   99


 


 02/07/21 13:45  98.4 F  82   18  128/89   98


 


 02/07/21 13:30  98.2 F  65   18  131/90   99


 


 02/07/21 13:10  98 F  82   18  129/81   97








                                Intake and Output











 02/07/21 02/08/21 02/08/21





 22:59 06:59 14:59


 


Intake Total 740 1280 100


 


Output Total 315 455 50


 


Balance 425 825 50


 


Intake:   


 


  Intake, IV Titration 500 800 100





  Amount   


 


    Sodium Chloride 0.9% 1, 500 800 100





    000 ml @ 100 mls/hr IV .   





    Q10H [facility] Rx#:[id number]   


 


  Oral 240 480 


 


Output:   


 


  Urine 315 455 50


 


Other:   


 


  Voiding Method Indwelling Catheter Indwelling Catheter 


 


  Weight 79.379 kg  














On examination patient is a young  female, very pleasant, in no acute 

distress.  Patient is alert and awake oriented to time place and person.  Speech

 and language functions are normal.  Attention, concentration and fund of 

knowledge is adequate.  On cranial nerve examination pupils are round and 

reactive to light, visual fields are full on confrontation, extraocular muscles 

are intact with no nystagmus.  Face is symmetric, tongue protrudes to the 

midline.  Palatal elevation and sensation normal, hearing and shoulder shrug 

normal.  On muscle strength testing there is no pronator drift and the strength 

is normal in arms and legs distally and proximally reflexes are slightly 

diminished, but plantars are downgoing.  Sensory touch is equal.  No ataxia for 

finger-to-nose or heel-to-shin testing, tone and bulk of muscles normal.  Gait 

normal.  On general examination, there is no carotid bruit or murmur, peripheral

 pulses present.  Abdomen soft nontender, chest is clear.





Results





- Laboratory Findings


CBC and BMP: 


                                 02/08/21 03:14





                                 02/08/21 03:14


Abnormal Lab Findings: 


                                  Abnormal Labs











  02/07/21 02/07/21 02/07/21





  13:46 13:46 14:51


 


Hct   


 


Chloride   


 


Carbon Dioxide   


 


BUN  19 H  


 


Glucose  101 H  


 


Cholesterol   218 H 


 


LDL Cholesterol, Calc   142 H 


 


Ur Specific Gravity    1.048 H


 


Urine Protein    Trace H














  02/08/21 02/08/21





  03:14 03:14


 


Hct  33.4 L 


 


Chloride   109 H


 


Carbon Dioxide   21 L


 


BUN   19 H


 


Glucose  


 


Cholesterol  


 


LDL Cholesterol, Calc  


 


Ur Specific Gravity  


 


Urine Protein  














Assessment and Plan


Assessment: 





* Acute right hemiparesis, now seems to have resolved.  Differential diagnosis 

  is between TIA versus complex migraine versus acephalgic migraine.  Patient 

  does have some hypercoagulable state due to use of birth control pills.


* History of chronic migraines.


* Hyperlipidemia


* Excessive caffeine intake.


Plan: 





* Patient underwent MRI of the brain, which is normal.


* CTA of head and neck are normal.


* 2-D echo showed normal EF 55-60%.  Normal left atrial size.  The aortic valve 

  is trileaflet in appears structurally normal.  No stenosis or regurgitation.


* Lipid panel showed total cholesterol 218, , which is elevated.  HDL 58,

   triglycerides 92.  I suggested patient to be started on statins, which she 

  declined.  She states that she will cut back on the red meat, and will have 

  recheck lipid panel in 3 months through her primary physician.


* Patient was recommended to speak to her PCP/GYN about the use of birth control

   pills, as at this time is relatively contraindicated because of this possible

   complex migraine versus TIA.


* Patient was recommended to start taking aspirin 81 mg daily, particularly 

  while she is in the hypercoagulable status.


* Regarding migraines, patient was recommended to keep a log of the headaches on

   the calendar, and if they are frequent, should follow-up with a neurologist 

  locally to manage chronic migraine.  Patient was recommended to limit amount 

  of caffeine, as excessive caffeine can produce vascular headaches as well.

## 2021-02-09 NOTE — ECHOF
Referral Reason:BUBBLE STUDY ONLY R/O SHUNT



MEASUREMENTS

--------

HEIGHT: 157.5 cm

WEIGHT: 79.4 kg

BP: 







FINDINGS

--------

Contrast study was performed with 1 iv injection of 8 ccs of agitated normal saline at rest.

Interatrial and interventricular septum intact.



CONCLUSIONS

--------

1. Contrast study was performed with 1 iv injection of 8 ccs of agitated normal saline at rest.

2. Interatrial and interventricular septum intact.





SONOGRAPHER: Keeley Reilly RDCS

## 2022-03-16 ENCOUNTER — HOSPITAL ENCOUNTER (EMERGENCY)
Dept: HOSPITAL 47 - EC | Age: 32
LOS: 1 days | Discharge: HOME | End: 2022-03-17
Payer: COMMERCIAL

## 2022-03-16 VITALS — HEART RATE: 74 BPM | DIASTOLIC BLOOD PRESSURE: 87 MMHG | TEMPERATURE: 98.4 F | SYSTOLIC BLOOD PRESSURE: 129 MMHG

## 2022-03-16 VITALS — RESPIRATION RATE: 16 BRPM

## 2022-03-16 DIAGNOSIS — N93.8: Primary | ICD-10-CM

## 2022-03-16 DIAGNOSIS — N83.201: ICD-10-CM

## 2022-03-16 DIAGNOSIS — Z79.82: ICD-10-CM

## 2022-03-16 LAB
ALBUMIN SERPL-MCNC: 4.1 G/DL (ref 3.5–5)
ALP SERPL-CCNC: 107 U/L (ref 38–126)
ALT SERPL-CCNC: 18 U/L (ref 4–34)
ANION GAP SERPL CALC-SCNC: 10 MMOL/L
AST SERPL-CCNC: 25 U/L (ref 14–36)
BASOPHILS # BLD AUTO: 0 K/UL (ref 0–0.2)
BASOPHILS NFR BLD AUTO: 1 %
BUN SERPL-SCNC: 16 MG/DL (ref 7–17)
CALCIUM SPEC-MCNC: 9.1 MG/DL (ref 8.4–10.2)
CHLORIDE SERPL-SCNC: 105 MMOL/L (ref 98–107)
CO2 SERPL-SCNC: 23 MMOL/L (ref 22–30)
EOSINOPHIL # BLD AUTO: 0.1 K/UL (ref 0–0.7)
EOSINOPHIL NFR BLD AUTO: 2 %
ERYTHROCYTE [DISTWIDTH] IN BLOOD BY AUTOMATED COUNT: 4.46 M/UL (ref 3.8–5.4)
ERYTHROCYTE [DISTWIDTH] IN BLOOD: 14.1 % (ref 11.5–15.5)
GLUCOSE SERPL-MCNC: 82 MG/DL (ref 74–99)
HCT VFR BLD AUTO: 36 % (ref 34–46)
HGB BLD-MCNC: 11.9 GM/DL (ref 11.4–16)
LYMPHOCYTES # SPEC AUTO: 2 K/UL (ref 1–4.8)
LYMPHOCYTES NFR SPEC AUTO: 30 %
MCH RBC QN AUTO: 26.7 PG (ref 25–35)
MCHC RBC AUTO-ENTMCNC: 33 G/DL (ref 31–37)
MCV RBC AUTO: 80.8 FL (ref 80–100)
MONOCYTES # BLD AUTO: 0.5 K/UL (ref 0–1)
MONOCYTES NFR BLD AUTO: 7 %
NEUTROPHILS # BLD AUTO: 4 K/UL (ref 1.3–7.7)
NEUTROPHILS NFR BLD AUTO: 60 %
PH UR: 6 [PH] (ref 5–8)
PLATELET # BLD AUTO: 263 K/UL (ref 150–450)
POTASSIUM SERPL-SCNC: 4.1 MMOL/L (ref 3.5–5.1)
PROT SERPL-MCNC: 6.9 G/DL (ref 6.3–8.2)
RBC UR QL: 3 /HPF (ref 0–5)
SODIUM SERPL-SCNC: 138 MMOL/L (ref 137–145)
SP GR UR: 1.02 (ref 1–1.03)
SQUAMOUS UR QL AUTO: 8 /HPF (ref 0–4)
UROBILINOGEN UR QL STRIP: <2 MG/DL (ref ?–2)
WBC # BLD AUTO: 6.8 K/UL (ref 3.8–10.6)
WBC # UR AUTO: 18 /HPF (ref 0–5)

## 2022-03-16 PROCEDURE — 96360 HYDRATION IV INFUSION INIT: CPT

## 2022-03-16 PROCEDURE — 84443 ASSAY THYROID STIM HORMONE: CPT

## 2022-03-16 PROCEDURE — 81025 URINE PREGNANCY TEST: CPT

## 2022-03-16 PROCEDURE — 85025 COMPLETE CBC W/AUTO DIFF WBC: CPT

## 2022-03-16 PROCEDURE — 99284 EMERGENCY DEPT VISIT MOD MDM: CPT

## 2022-03-16 PROCEDURE — 36415 COLL VENOUS BLD VENIPUNCTURE: CPT

## 2022-03-16 PROCEDURE — 81001 URINALYSIS AUTO W/SCOPE: CPT

## 2022-03-16 PROCEDURE — 80053 COMPREHEN METABOLIC PANEL: CPT

## 2022-03-16 PROCEDURE — 76856 US EXAM PELVIC COMPLETE: CPT

## 2022-03-16 PROCEDURE — 87086 URINE CULTURE/COLONY COUNT: CPT

## 2022-03-16 NOTE — US
EXAMINATION TYPE: US pelvic complete

 

DATE OF EXAM: 3/16/2022

 

COMPARISON: NONE

 

CLINICAL HISTORY: Vaginal bleeding, irregular menses. Patient presents with vaginal bleeding and righ
t pelvic pain for 4 days.

 

TECHNIQUE:  Transabdominal (TA).  

 



 

EXAM MEASUREMENTS:

 

Uterus:  8.7 x 4.4 x 5.2 cm

Endometrial Stripe: 0.9 cm

Right Ovary:  2.9 x 2.6 x 2.6 cm

Left Ovary:  2.4 x 1.7 x 2.0 cm

 

 

 

1. Uterus:  Anteverted   wnl

2. Endometrium:  wnl

3. Right Ovary:  Dominant follicle measuring 2.0 x 1.7 x 1.6 cm

4. Left Ovary:  wnl

5. Bilateral Adnexa:  wnl

6. Posterior cul-de-sac:  wnl

 

 

 

IMPRESSION:

2 cm cyst on the right ovary. No solid adnexal mass. Normal uterus.

## 2022-03-16 NOTE — ED
Female Urogenital HPI





- General


Chief complaint: Vaginal Bleeding


Stated complaint: abd pain


Time Seen by Provider: 03/16/22 22:11


Source: patient, RN notes reviewed


Mode of arrival: ambulatory


Limitations: no limitations





- History of Present Illness


Initial comments: 





This is a pleasant 31-year-old female who presents in respiratory complaining of

vaginal bleeding for the past 3 days.  Patient states she is 2 weeks early on 

her menses.  She states that she is going through about 3 pads per hour over the

past several hours.  She denies any lightheadedness.  No palpitations.  No chest

pain or shortness of breath.  Has some cramping pain in the pelvic area.  Does 

not believe she is pregnant.


No headache, no fever or chills, no changes in vision or hearing, no sore throat

or difficulty with speech, no neck pain, no chest pain or shortness of breath, 

no nausea or vomiting, no changes in urination or bowel movements, no numbness 

or tingling, no extremity pain, no skin rashes or lesions.





- Related Data


                                Home Medications











 Medication  Instructions  Recorded  Confirmed


 


ARIPiprazole 2 mg PO HS 03/16/22 03/16/22


 


Aspirin EC [Ecotrin Low Dose] 81 mg PO HS 03/16/22 03/16/22


 


FLUoxetine HCL [Sarafem] 60 mg PO HS 03/16/22 03/16/22


 


Magnesium 250 mg PO HS 03/16/22 03/16/22


 


Niacin 500 mg PO HS 03/16/22 03/16/22











                                    Allergies











Allergy/AdvReac Type Severity Reaction Status Date / Time


 


topiramate [From Topamax] Allergy  Hallucinati Verified 03/16/22 23:32





   ons  














Review of Systems


ROS Statement: 


Those systems with pertinent positive or pertinent negative responses have been 

documented in the HPI.





ROS Other: All systems not noted in ROS Statement are negative.





Past Medical History


Past Medical History: No Reported History


Additional Past Medical History / Comment(s): migraines, anemia


History of Any Multi-Drug Resistant Organisms: None Reported


Past Surgical History: No Surgical Hx Reported


Additional Past Surgical History / Comment(s): wisdom teeth removal 2011


Past Anesthesia/Blood Transfusion Reactions: No Reported Reaction


Past Psychological History: No Psychological Hx Reported


Smoking Status: Never smoker


Past Alcohol Use History: Rare


Past Drug Use History: None Reported





- Past Family History


  ** Mother


Family Medical History: Cancer





  ** Father


Family Medical History: Hypertension





  ** Brother(s)


Family Medical History: Hypertension





General Exam





- General Exam Comments


Initial Comments: 





Healthy-appearing 31-year-old female in no acute distress.  Patient does not 

appear to be ill or toxic.  Vital signs reviewed.  Capillary refill less than 2 

seconds.


Limitations: no limitations


General appearance: alert, in no apparent distress


Head exam: Present: atraumatic, normocephalic, normal inspection


Eye exam: Present: normal appearance, PERRL, EOMI.  Absent: scleral icterus, 

conjunctival injection, periorbital swelling


ENT exam: Present: normal exam, mucous membranes moist


Neck exam: Present: normal inspection.  Absent: tenderness, meningismus, 

lymphadenopathy


Respiratory exam: Present: normal lung sounds bilaterally.  Absent: respiratory 

distress, wheezes, rales, rhonchi, stridor


Cardiovascular Exam: Present: regular rate, normal rhythm, normal heart sounds. 

Absent: systolic murmur, diastolic murmur, rubs, gallop, clicks


GI/Abdominal exam: Present: soft, normal bowel sounds.  Absent: distended, 

tenderness, guarding, rebound, rigid


External exam: Present: normal external exam.  Absent: erythema, swelling, 

lesions, lacerations, ecchymosis


Speculum exam: Present: vaginal bleeding (Mild vaginal bleeding with a closed 

cervical os.  No evidence of inflammation.  No clots.  Chaperoned by female 

RN.).  Absent: erythema, vaginal discharge, cervical discharge, foreign body, 

tissue, laceration


Extremities exam: Present: normal inspection, full ROM, normal capillary refill.

 Absent: tenderness, pedal edema, joint swelling, calf tenderness


Back exam: Present: normal inspection


Neurological exam: Present: alert, oriented X3, CN II-XII intact


Psychiatric exam: Present: normal affect, normal mood


Skin exam: Present: warm, dry, intact, normal color.  Absent: rash





Course


                                   Vital Signs











  03/16/22 03/16/22 03/16/22





  21:30 23:45 23:50


 


Temperature 98.7 F 98.4 F 


 


Pulse Rate 88  


 


Pulse Rate [  73 82





Pulse Oximetery   





]   


 


Respiratory 16 16 





Rate   


 


Blood Pressure 133/88  


 


Blood Pressure  118/71 120/80





[Left Arm]   


 


O2 Sat by Pulse 98 97 





Oximetry   














  03/16/22





  23:54


 


Temperature 


 


Pulse Rate 


 


Pulse Rate [ 74





Pulse Oximetery 





] 


 


Respiratory 





Rate 


 


Blood Pressure 


 


Blood Pressure 129/87





[Left Arm] 


 


O2 Sat by Pulse 





Oximetry 














- Reevaluation(s)


Reevaluation #1: 





03/16/22 23:55


Medical record is reviewed


Symptoms are improved here in the emergency department


Patient is informed of results and questions answered


Patient in no distress





Medical Decision Making





- Medical Decision Making





Patient had only minimal bleeding on pelvic examination.  We'll obtain an 

ultrasound and general laboratory work.





She testing, plan for evaluation.





All findings discussed with the patient.  Ultrasound shows a right ovarian cyst,

not hemorrhagic.  Patient's bleeding has slowed down.  Patient hemodynamically 

stable.  Hemoglobin is normal.  The patient follow-up with her gynecologist.  

Plan discussed.  Patient concurs.





Patient was told to return to the ER for any signs or symptoms worsen.  Told to 

return immediately if any other problems arise.  All questions answered.  

Treatment plan discussed.  Patient in agreement


Every effort has been made to ensure accuracy of this dictation.  However, due 

to the limitations of electronic medical records and dictation devices, errors 

in charting still occur.





- Lab Data


Result diagrams: 


                                 03/16/22 22:12





                                 03/16/22 22:12


                                   Lab Results











  03/16/22 03/16/22 03/16/22 Range/Units





  22:12 22:12 22:12 


 


WBC  6.8    (3.8-10.6)  k/uL


 


RBC  4.46    (3.80-5.40)  m/uL


 


Hgb  11.9    (11.4-16.0)  gm/dL


 


Hct  36.0    (34.0-46.0)  %


 


MCV  80.8    (80.0-100.0)  fL


 


MCH  26.7    (25.0-35.0)  pg


 


MCHC  33.0    (31.0-37.0)  g/dL


 


RDW  14.1    (11.5-15.5)  %


 


Plt Count  263    (150-450)  k/uL


 


MPV  7.4    


 


Neutrophils %  60    %


 


Lymphocytes %  30    %


 


Monocytes %  7    %


 


Eosinophils %  2    %


 


Basophils %  1    %


 


Neutrophils #  4.0    (1.3-7.7)  k/uL


 


Lymphocytes #  2.0    (1.0-4.8)  k/uL


 


Monocytes #  0.5    (0-1.0)  k/uL


 


Eosinophils #  0.1    (0-0.7)  k/uL


 


Basophils #  0.0    (0-0.2)  k/uL


 


Sodium     (137-145)  mmol/L


 


Potassium     (3.5-5.1)  mmol/L


 


Chloride     ()  mmol/L


 


Carbon Dioxide     (22-30)  mmol/L


 


Anion Gap     mmol/L


 


BUN     (7-17)  mg/dL


 


Creatinine     (0.52-1.04)  mg/dL


 


Est GFR (CKD-EPI)AfAm     (>60 ml/min/1.73 sqM)  


 


Est GFR (CKD-EPI)NonAf     (>60 ml/min/1.73 sqM)  


 


Glucose     (74-99)  mg/dL


 


Calcium     (8.4-10.2)  mg/dL


 


Total Bilirubin     (0.2-1.3)  mg/dL


 


AST     (14-36)  U/L


 


ALT     (4-34)  U/L


 


Alkaline Phosphatase     ()  U/L


 


Total Protein     (6.3-8.2)  g/dL


 


Albumin     (3.5-5.0)  g/dL


 


TSH     (0.465-4.680)  mIU/L


 


Urine Color   Yellow   


 


Urine Appearance   Cloudy H   (Clear)  


 


Urine pH   6.0   (5.0-8.0)  


 


Ur Specific Gravity   1.024   (1.001-1.035)  


 


Urine Protein   Trace H   (Negative)  


 


Urine Glucose (UA)   Negative   (Negative)  


 


Urine Ketones   Negative   (Negative)  


 


Urine Blood   Large H   (Negative)  


 


Urine Nitrite   Negative   (Negative)  


 


Urine Bilirubin   Negative   (Negative)  


 


Urine Urobilinogen   <2.0   (<2.0)  mg/dL


 


Ur Leukocyte Esterase   Moderate H   (Negative)  


 


Urine RBC   3   (0-5)  /hpf


 


Urine WBC   18 H   (0-5)  /hpf


 


Ur Squamous Epith Cells   8 H   (0-4)  /hpf


 


Urine Bacteria   Rare H   (None)  /hpf


 


Urine Mucus   Few H   (None)  /hpf


 


Urine HCG, Qual    Not Detected  (Not Detectd)  














  03/16/22 Range/Units





  22:12 


 


WBC   (3.8-10.6)  k/uL


 


RBC   (3.80-5.40)  m/uL


 


Hgb   (11.4-16.0)  gm/dL


 


Hct   (34.0-46.0)  %


 


MCV   (80.0-100.0)  fL


 


MCH   (25.0-35.0)  pg


 


MCHC   (31.0-37.0)  g/dL


 


RDW   (11.5-15.5)  %


 


Plt Count   (150-450)  k/uL


 


MPV   


 


Neutrophils %   %


 


Lymphocytes %   %


 


Monocytes %   %


 


Eosinophils %   %


 


Basophils %   %


 


Neutrophils #   (1.3-7.7)  k/uL


 


Lymphocytes #   (1.0-4.8)  k/uL


 


Monocytes #   (0-1.0)  k/uL


 


Eosinophils #   (0-0.7)  k/uL


 


Basophils #   (0-0.2)  k/uL


 


Sodium  138  (137-145)  mmol/L


 


Potassium  4.1  (3.5-5.1)  mmol/L


 


Chloride  105  ()  mmol/L


 


Carbon Dioxide  23  (22-30)  mmol/L


 


Anion Gap  10  mmol/L


 


BUN  16  (7-17)  mg/dL


 


Creatinine  0.67  (0.52-1.04)  mg/dL


 


Est GFR (CKD-EPI)AfAm  >90  (>60 ml/min/1.73 sqM)  


 


Est GFR (CKD-EPI)NonAf  >90  (>60 ml/min/1.73 sqM)  


 


Glucose  82  (74-99)  mg/dL


 


Calcium  9.1  (8.4-10.2)  mg/dL


 


Total Bilirubin  0.4  (0.2-1.3)  mg/dL


 


AST  25  (14-36)  U/L


 


ALT  18  (4-34)  U/L


 


Alkaline Phosphatase  107  ()  U/L


 


Total Protein  6.9  (6.3-8.2)  g/dL


 


Albumin  4.1  (3.5-5.0)  g/dL


 


TSH  0.923  (0.465-4.680)  mIU/L


 


Urine Color   


 


Urine Appearance   (Clear)  


 


Urine pH   (5.0-8.0)  


 


Ur Specific Gravity   (1.001-1.035)  


 


Urine Protein   (Negative)  


 


Urine Glucose (UA)   (Negative)  


 


Urine Ketones   (Negative)  


 


Urine Blood   (Negative)  


 


Urine Nitrite   (Negative)  


 


Urine Bilirubin   (Negative)  


 


Urine Urobilinogen   (<2.0)  mg/dL


 


Ur Leukocyte Esterase   (Negative)  


 


Urine RBC   (0-5)  /hpf


 


Urine WBC   (0-5)  /hpf


 


Ur Squamous Epith Cells   (0-4)  /hpf


 


Urine Bacteria   (None)  /hpf


 


Urine Mucus   (None)  /hpf


 


Urine HCG, Qual   (Not Detectd)  














Disposition


Clinical Impression: 


 Dysfunctional uterine bleeding, Right ovarian cyst





Disposition: HOME SELF-CARE


Condition: Good


Instructions (If sedation given, give patient instructions):  Dysmenorrhea (ED),

Abnormal (Dysfunctional) Uterine Bleeding (ED), Ovarian Cyst (ED)


Additional Instructions: 


Follow-up with your regular physician as directed.  Return to the ER immediately

if any symptoms worsen, new symptoms arise, or any other problems develop.


Is patient prescribed a controlled substance at d/c from ED?: No


Referrals: 


Og Jeter MD [STAFF PHYSICIAN] - 1-2 days


Time of Disposition: 23:56